# Patient Record
Sex: FEMALE | Race: WHITE | URBAN - NONMETROPOLITAN AREA
[De-identification: names, ages, dates, MRNs, and addresses within clinical notes are randomized per-mention and may not be internally consistent; named-entity substitution may affect disease eponyms.]

---

## 2022-12-06 ENCOUNTER — OFFICE VISIT (OUTPATIENT)
Dept: URBAN - NONMETROPOLITAN AREA CLINIC 4 | Facility: CLINIC | Age: 57
End: 2022-12-06
Payer: MEDICARE

## 2022-12-06 ENCOUNTER — LAB OUTSIDE AN ENCOUNTER (OUTPATIENT)
Dept: URBAN - NONMETROPOLITAN AREA CLINIC 4 | Facility: CLINIC | Age: 57
End: 2022-12-06

## 2022-12-06 DIAGNOSIS — K21.9 GASTROESOPHAGEAL REFLUX DISEASE, UNSPECIFIED WHETHER ESOPHAGITIS PRESENT: ICD-10-CM

## 2022-12-06 DIAGNOSIS — G40.909 SEIZURE DISORDER: ICD-10-CM

## 2022-12-06 DIAGNOSIS — R10.10 UPPER ABDOMINAL PAIN: ICD-10-CM

## 2022-12-06 DIAGNOSIS — R29.6 FREQUENT FALLS: ICD-10-CM

## 2022-12-06 DIAGNOSIS — F06.30 MOOD DISORDER DUE TO A GENERAL MEDICAL CONDITION: ICD-10-CM

## 2022-12-06 DIAGNOSIS — R11.10 VOMITING, UNSPECIFIED VOMITING TYPE, UNSPECIFIED WHETHER NAUSEA PRESENT: ICD-10-CM

## 2022-12-06 DIAGNOSIS — R62.50 DEVELOPMENTAL DELAY: ICD-10-CM

## 2022-12-06 PROBLEM — 279992002: Status: ACTIVE | Noted: 2022-12-06

## 2022-12-06 PROCEDURE — 99204 OFFICE O/P NEW MOD 45 MIN: CPT | Performed by: REGISTERED NURSE

## 2022-12-06 RX ORDER — OMEPRAZOLE 40 MG/1
1 CAPSULE 30 MINUTES BEFORE MORNING MEAL CAPSULE, DELAYED RELEASE ORAL ONCE A DAY
Status: ACTIVE | COMMUNITY

## 2022-12-06 RX ORDER — OXYBUTYNIN CHLORIDE 10 MG/1
1 TABLET TABLET, EXTENDED RELEASE ORAL ONCE A DAY
Status: ACTIVE | COMMUNITY

## 2022-12-06 RX ORDER — ONDANSETRON 4 MG/1
1 TABLET ON THE TONGUE AND ALLOW TO DISSOLVE TABLET, ORALLY DISINTEGRATING ORAL EVERY 8 HOURS
Qty: 60 | Refills: 1 | OUTPATIENT
Start: 2022-12-06

## 2022-12-06 RX ORDER — LAMOTRIGINE 100 MG/1
1 TABLET TABLET ORAL ONCE A DAY
Status: ACTIVE | COMMUNITY

## 2022-12-06 RX ORDER — ATORVASTATIN CALCIUM 40 MG/1
1 TABLET TABLET, FILM COATED ORAL ONCE A DAY
Status: ACTIVE | COMMUNITY

## 2022-12-06 RX ORDER — LEVOTHYROXINE SODIUM 25 UG/1
1 TABLET IN THE MORNING ON AN EMPTY STOMACH TABLET ORAL ONCE A DAY
Status: ACTIVE | COMMUNITY

## 2022-12-06 RX ORDER — ASPIRIN 81 MG
5 DROPS INTO AFFECTED EAR TABLET, DELAYED RELEASE (ENTERIC COATED) ORAL TWICE A DAY
Status: ACTIVE | COMMUNITY

## 2022-12-06 RX ORDER — QUETIAPINE 100 MG/1
1 TABLET AT BEDTIME TABLET, FILM COATED ORAL ONCE A DAY
Status: ACTIVE | COMMUNITY

## 2022-12-06 RX ORDER — DOCUSATE SODIUM 100 MG/1
1 CAPSULE AS NEEDED CAPSULE ORAL ONCE A DAY
Status: ACTIVE | COMMUNITY

## 2022-12-06 RX ORDER — GABAPENTIN 300 MG/1
1 CAPSULE CAPSULE ORAL ONCE A DAY
Status: ACTIVE | COMMUNITY

## 2022-12-06 NOTE — HPI-TODAY'S VISIT:
12/6/22: Pt is a 58 yo female with PMH of developmental delay, hypothyroidism, HLD, GERD and seizures who was referred by [facility] ED for follow up on abdominal pain.  Pt seen in ED on 12/3/22 with c/o upper abdominal pain. According to EMS, the patient lives at home with her caregiver who reports the patient has not eaten since Monday 11/28/22 and can not tolerate PO intake. The patient reports she has constant, moderate, non-radiating epigastric abdominal pain and vomiting.  CT abdomen obtained in the ED and negative for any acute intra-abdominal pathology. Noted to have hepatic steatosis. LFTs mildly elevated, AST 32, ALT 18. Tbili and Alk phos wnl. Lipase wnl. CBC unremarkable. She was discharged home on Pepcid, Carafate and Bentyl.   Since discharge, she continues to c/o abdominal pain and vomiting. Denies any hematemesis. She has been taking Ensure with medications, but vomits afterwards. Per caregiver, pills and food seem to go down ok, but she eventually vomits afterward. She has not eaten for 7 days. Has not urinated since Sunday. She is currently taking  omeprazole 40 mg daily, Pepcid BID, Carfate and Bentyl. Not sure if she has had EGD in past. She has never had a screening colonoscopy.

## 2022-12-22 ENCOUNTER — TELEPHONE ENCOUNTER (OUTPATIENT)
Dept: URBAN - METROPOLITAN AREA CLINIC 92 | Facility: CLINIC | Age: 57
End: 2022-12-22

## 2022-12-22 ENCOUNTER — OFFICE VISIT (OUTPATIENT)
Dept: URBAN - METROPOLITAN AREA MEDICAL CENTER 23 | Facility: MEDICAL CENTER | Age: 57
End: 2022-12-22
Payer: MEDICARE

## 2022-12-22 DIAGNOSIS — K29.60 ADENOPAPILLOMATOSIS GASTRICA: ICD-10-CM

## 2022-12-22 DIAGNOSIS — K31.7 BENIGN GASTRIC POLYP: ICD-10-CM

## 2022-12-22 PROCEDURE — 43239 EGD BIOPSY SINGLE/MULTIPLE: CPT | Performed by: INTERNAL MEDICINE

## 2022-12-22 RX ORDER — OXYBUTYNIN CHLORIDE 10 MG/1
1 TABLET TABLET, EXTENDED RELEASE ORAL ONCE A DAY
Status: ACTIVE | COMMUNITY

## 2022-12-22 RX ORDER — GABAPENTIN 300 MG/1
1 CAPSULE CAPSULE ORAL ONCE A DAY
Status: ACTIVE | COMMUNITY

## 2022-12-22 RX ORDER — LAMOTRIGINE 100 MG/1
1 TABLET TABLET ORAL ONCE A DAY
Status: ACTIVE | COMMUNITY

## 2022-12-22 RX ORDER — DOCUSATE SODIUM 100 MG/1
1 CAPSULE AS NEEDED CAPSULE ORAL ONCE A DAY
Status: ACTIVE | COMMUNITY

## 2022-12-22 RX ORDER — ATORVASTATIN CALCIUM 40 MG/1
1 TABLET TABLET, FILM COATED ORAL ONCE A DAY
Status: ACTIVE | COMMUNITY

## 2022-12-22 RX ORDER — OMEPRAZOLE 40 MG/1
1 CAPSULE 30 MINUTES BEFORE MORNING MEAL CAPSULE, DELAYED RELEASE ORAL ONCE A DAY
Status: ACTIVE | COMMUNITY

## 2022-12-22 RX ORDER — ONDANSETRON 4 MG/1
1 TABLET ON THE TONGUE AND ALLOW TO DISSOLVE TABLET, ORALLY DISINTEGRATING ORAL EVERY 8 HOURS
Qty: 60 | Refills: 1 | Status: ACTIVE | COMMUNITY
Start: 2022-12-06

## 2022-12-22 RX ORDER — ASPIRIN 81 MG
5 DROPS INTO AFFECTED EAR TABLET, DELAYED RELEASE (ENTERIC COATED) ORAL TWICE A DAY
Status: ACTIVE | COMMUNITY

## 2022-12-22 RX ORDER — LEVOTHYROXINE SODIUM 25 UG/1
1 TABLET IN THE MORNING ON AN EMPTY STOMACH TABLET ORAL ONCE A DAY
Status: ACTIVE | COMMUNITY

## 2022-12-22 RX ORDER — QUETIAPINE 100 MG/1
1 TABLET AT BEDTIME TABLET, FILM COATED ORAL ONCE A DAY
Status: ACTIVE | COMMUNITY

## 2023-02-08 ENCOUNTER — WEB ENCOUNTER (OUTPATIENT)
Dept: URBAN - NONMETROPOLITAN AREA CLINIC 4 | Facility: CLINIC | Age: 58
End: 2023-02-08

## 2023-02-08 ENCOUNTER — OFFICE VISIT (OUTPATIENT)
Dept: URBAN - NONMETROPOLITAN AREA CLINIC 4 | Facility: CLINIC | Age: 58
End: 2023-02-08
Payer: MEDICARE

## 2023-02-08 ENCOUNTER — DASHBOARD ENCOUNTERS (OUTPATIENT)
Age: 58
End: 2023-02-08

## 2023-02-08 VITALS — HEART RATE: 96 BPM | DIASTOLIC BLOOD PRESSURE: 86 MMHG | SYSTOLIC BLOOD PRESSURE: 127 MMHG | TEMPERATURE: 97.9 F

## 2023-02-08 DIAGNOSIS — G40.909 SEIZURE DISORDER: ICD-10-CM

## 2023-02-08 DIAGNOSIS — K31.84 GASTROPARESIS: ICD-10-CM

## 2023-02-08 DIAGNOSIS — K21.9 ACID REFLUX: ICD-10-CM

## 2023-02-08 DIAGNOSIS — K29.50 CHRONIC GASTRITIS WITHOUT BLEEDING, UNSPECIFIED GASTRITIS TYPE: ICD-10-CM

## 2023-02-08 PROBLEM — 128613002: Status: ACTIVE | Noted: 2022-12-06

## 2023-02-08 PROBLEM — 235675006: Status: ACTIVE | Noted: 2023-02-08

## 2023-02-08 PROBLEM — 8493009: Status: ACTIVE | Noted: 2023-02-08

## 2023-02-08 PROBLEM — 235595009: Status: ACTIVE | Noted: 2022-12-06

## 2023-02-08 PROCEDURE — 99213 OFFICE O/P EST LOW 20 MIN: CPT | Performed by: REGISTERED NURSE

## 2023-02-08 RX ORDER — ATORVASTATIN CALCIUM 40 MG/1
1 TABLET TABLET, FILM COATED ORAL ONCE A DAY
Status: ACTIVE | COMMUNITY

## 2023-02-08 RX ORDER — OMEPRAZOLE 40 MG/1
1 CAPSULE 30 MINUTES BEFORE MORNING MEAL CAPSULE, DELAYED RELEASE ORAL ONCE A DAY
Status: ACTIVE | COMMUNITY

## 2023-02-08 RX ORDER — DOCUSATE SODIUM 100 MG/1
1 CAPSULE AS NEEDED CAPSULE ORAL ONCE A DAY
Status: ACTIVE | COMMUNITY

## 2023-02-08 RX ORDER — LEVOTHYROXINE SODIUM 25 UG/1
1 TABLET IN THE MORNING ON AN EMPTY STOMACH TABLET ORAL ONCE A DAY
Status: ACTIVE | COMMUNITY

## 2023-02-08 RX ORDER — LAMOTRIGINE 100 MG/1
1 TABLET TABLET ORAL ONCE A DAY
Status: ACTIVE | COMMUNITY

## 2023-02-08 RX ORDER — ASPIRIN 81 MG
5 DROPS INTO AFFECTED EAR TABLET, DELAYED RELEASE (ENTERIC COATED) ORAL TWICE A DAY
Status: ACTIVE | COMMUNITY

## 2023-02-08 RX ORDER — ONDANSETRON 4 MG/1
1 TABLET ON THE TONGUE AND ALLOW TO DISSOLVE TABLET, ORALLY DISINTEGRATING ORAL EVERY 8 HOURS
Qty: 60 | Refills: 1 | Status: ACTIVE | COMMUNITY
Start: 2022-12-06

## 2023-02-08 RX ORDER — QUETIAPINE 100 MG/1
1 TABLET AT BEDTIME TABLET, FILM COATED ORAL ONCE A DAY
Status: ACTIVE | COMMUNITY

## 2023-02-08 RX ORDER — SUCRALFATE 1 G/1
1 TABLET ON AN EMPTY STOMACH TABLET ORAL TWICE A DAY
Status: ACTIVE | COMMUNITY

## 2023-02-08 RX ORDER — OXYBUTYNIN CHLORIDE 10 MG/1
1 TABLET TABLET, EXTENDED RELEASE ORAL ONCE A DAY
Status: ACTIVE | COMMUNITY

## 2023-02-08 RX ORDER — GABAPENTIN 300 MG/1
1 CAPSULE CAPSULE ORAL ONCE A DAY
Status: ACTIVE | COMMUNITY

## 2023-02-08 NOTE — PHYSICAL EXAM CONSTITUTIONAL:
normal,  alert,  pleasant, well nourished, in no acute distress,  well developed, well nourished,  ambulating with wheelchair

## 2023-02-08 NOTE — HPI-TODAY'S VISIT:
12/6/22: Pt is a 58 yo female with PMH of developmental delay, hypothyroidism, HLD, GERD and seizures who was referred by [facility] ED for follow up on abdominal pain.  Pt seen in ED on 12/3/22 with c/o upper abdominal pain. According to EMS, the patient lives at home with her caregiver who reports the patient has not eaten since Monday 11/28/22 and can not tolerate PO intake. The patient reports she has constant, moderate, non-radiating epigastric abdominal pain and vomiting.  CT abdomen obtained in the ED and negative for any acute intra-abdominal pathology. Noted to have hepatic steatosis. LFTs mildly elevated, AST 32, ALT 18. Tbili and Alk phos wnl. Lipase wnl. CBC unremarkable. She was discharged home on Pepcid, Carafate and Bentyl.   Since discharge, she continues to c/o abdominal pain and vomiting. Denies any hematemesis. She has been taking Ensure with medications, but vomits afterwards. Per caregiver, pills and food seem to go down ok, but she eventually vomits afterward. She has not eaten for 7 days. Has not urinated since Sunday. She is currently taking omeprazole 40 mg daily, Pepcid BID, Carfate and Bentyl. Not sure if she has had EGD in past. She has never had a screening colonoscopy.  2/8/23: Pt RTC for f/u. Had EGD 12/22/22. Impression:   1.	Normal esophagus with gastroesophageal junction at 40 cm from the incisors. 2.	Regular Z-line at 40 cm. 3.	Diffuse erythematous gastritis.  Biopsies taken to rule out H. pylori infection. 4.	Multiple gastric polyps.  2 sampled. 5.	Patent and symmetric pylorus. 6.	Normal duodenum.  Biopsies taken. Bx benign    She had GES on 1/10/23. FINDINGS: No gastroesophageal reflux. 1 hour:   80 % retention. Normal is considered between 30 and 90%. 2 hours: 75 % retention. Normal is considered less than 60%. 3 hours: 62 % retention. Normal is considered less than 30%. 4 hours: 64% retention. Normal is considered less than 10%. T1/2: 342 minutes. IMPRESSION: Delayed abnormal emptying probably reflects gastroparesis.  Pt no longer complaints on abdominal pain or vomiting. She is tolerating oral intake.

## 2025-02-26 ENCOUNTER — OFFICE VISIT (OUTPATIENT)
Dept: URBAN - METROPOLITAN AREA CLINIC 115 | Facility: CLINIC | Age: 60
End: 2025-02-26
Payer: MEDICARE

## 2025-02-26 VITALS
TEMPERATURE: 98.1 F | HEART RATE: 81 BPM | DIASTOLIC BLOOD PRESSURE: 59 MMHG | BODY MASS INDEX: 30.56 KG/M2 | WEIGHT: 179 LBS | HEIGHT: 64 IN | SYSTOLIC BLOOD PRESSURE: 108 MMHG

## 2025-02-26 DIAGNOSIS — F81.9 LEARNING DISABILITY: ICD-10-CM

## 2025-02-26 DIAGNOSIS — G40.909 SEIZURE DISORDER: ICD-10-CM

## 2025-02-26 DIAGNOSIS — Z12.11 COLON CANCER SCREENING: ICD-10-CM

## 2025-02-26 PROBLEM — 1855002: Status: ACTIVE | Noted: 2025-02-26

## 2025-02-26 PROCEDURE — 99203 OFFICE O/P NEW LOW 30 MIN: CPT | Performed by: INTERNAL MEDICINE

## 2025-02-26 RX ORDER — GABAPENTIN 300 MG/1
1 CAPSULE CAPSULE ORAL ONCE A DAY
Status: ACTIVE | COMMUNITY

## 2025-02-26 RX ORDER — PAROXETINE HYDROCHLORIDE HEMIHYDRATE 10 MG/1
1 TABLET IN THE MORNING TABLET, FILM COATED ORAL ONCE A DAY
Status: ACTIVE | COMMUNITY

## 2025-02-26 RX ORDER — OMEPRAZOLE 40 MG/1
1 CAPSULE 30 MINUTES BEFORE MORNING MEAL CAPSULE, DELAYED RELEASE ORAL ONCE A DAY
Status: ACTIVE | COMMUNITY

## 2025-02-26 RX ORDER — QUETIAPINE 100 MG/1
1 TABLET AT BEDTIME TABLET, FILM COATED ORAL ONCE A DAY
Status: DISCONTINUED | COMMUNITY

## 2025-02-26 RX ORDER — ASPIRIN 81 MG
5 DROPS INTO AFFECTED EAR TABLET, DELAYED RELEASE (ENTERIC COATED) ORAL TWICE A DAY
Status: ACTIVE | COMMUNITY

## 2025-02-26 RX ORDER — HYDROXYZINE HYDROCHLORIDE 25 MG/1
1 TABLET AS NEEDED TABLET, FILM COATED ORAL ONCE A DAY
Status: ACTIVE | COMMUNITY

## 2025-02-26 RX ORDER — LAMOTRIGINE 100 MG/1
1 TABLET TABLET ORAL ONCE A DAY
Status: ACTIVE | COMMUNITY

## 2025-02-26 RX ORDER — DIPHENOXYLATE HYDROCHLORIDE AND ATROPINE SULFATE 2.5; .025 MG/1; MG/1
1 TABLET AS NEEDED TABLET ORAL
Status: ACTIVE | COMMUNITY

## 2025-02-26 RX ORDER — DIVALPROEX SODIUM 500 MG/1
1 TABLET TABLET, DELAYED RELEASE ORAL TWICE A DAY
Status: ACTIVE | COMMUNITY

## 2025-02-26 RX ORDER — FAMOTIDINE 20 MG/1
TAKE 1 TABLET TABLET, FILM COATED ORAL ONCE A DAY
Status: ACTIVE | COMMUNITY

## 2025-02-26 RX ORDER — CALCIUM POLYCARBOPHIL 625 MG
2 TABLETS AS NEEDED TABLET ORAL THREE TIMES A DAY
Status: DISCONTINUED | COMMUNITY

## 2025-02-26 RX ORDER — DOCUSATE SODIUM 100 MG/1
1 CAPSULE AS NEEDED CAPSULE ORAL ONCE A DAY
Status: DISCONTINUED | COMMUNITY

## 2025-02-26 RX ORDER — OXYBUTYNIN CHLORIDE 10 MG/1
1 TABLET TABLET, EXTENDED RELEASE ORAL ONCE A DAY
Status: ACTIVE | COMMUNITY

## 2025-02-26 RX ORDER — LEVOTHYROXINE SODIUM 25 UG/1
1 TABLET IN THE MORNING ON AN EMPTY STOMACH TABLET ORAL ONCE A DAY
Status: ACTIVE | COMMUNITY

## 2025-02-26 RX ORDER — SUCRALFATE 1 G/1
1 TABLET ON AN EMPTY STOMACH TABLET ORAL TWICE A DAY
Status: DISCONTINUED | COMMUNITY

## 2025-02-26 RX ORDER — LACOSAMIDE 200 MG/1
1 TABLET TABLET, FILM COATED ORAL TWICE A DAY
Status: ACTIVE | COMMUNITY

## 2025-02-26 RX ORDER — ONDANSETRON 4 MG/1
1 TABLET ON THE TONGUE AND ALLOW TO DISSOLVE TABLET, ORALLY DISINTEGRATING ORAL EVERY 8 HOURS
Qty: 60 | Refills: 1 | Status: DISCONTINUED | COMMUNITY
Start: 2022-12-06

## 2025-02-26 RX ORDER — ATORVASTATIN CALCIUM 10 MG/1
1 TABLET TABLET, FILM COATED ORAL ONCE A DAY
Status: ACTIVE | COMMUNITY

## 2025-02-26 NOTE — HPI-TODAY'S VISIT:
Patient was seen today for screening colonoscopy evaluation.  She has history of learning disabilities because of severe seizure disorder.  She lives in a personal care home.  She recently was transferred from a different personal-care home and hence the caregiver have taken to a PCP who recommended a screening colonoscopy because of her age.  As per them they deny any active GI symptoms of lower GI symptoms of constipation diarrhea.  Patient has a very limited historian as per caregiver patient gives consent for herself patient denies any abdominal pain diarrhea or at this time.  Patient's caregiver not aware of having recent seizure she was she had been with them for only 6 weeks.  On her current medications were reviewed.

## 2025-02-26 NOTE — PHYSICAL EXAM CONSTITUTIONAL:
well developed, well nourished , in no acute distress ,in wheel chair with limitedl communication ability